# Patient Record
Sex: MALE
[De-identification: names, ages, dates, MRNs, and addresses within clinical notes are randomized per-mention and may not be internally consistent; named-entity substitution may affect disease eponyms.]

---

## 2019-12-28 ENCOUNTER — HOSPITAL ENCOUNTER (EMERGENCY)
Dept: HOSPITAL 25 - UCEAST | Age: 34
Discharge: HOME | End: 2019-12-28
Payer: COMMERCIAL

## 2019-12-28 DIAGNOSIS — J02.9: ICD-10-CM

## 2019-12-28 DIAGNOSIS — Z88.0: ICD-10-CM

## 2019-12-28 DIAGNOSIS — H66.90: Primary | ICD-10-CM

## 2019-12-28 DIAGNOSIS — Z88.2: ICD-10-CM

## 2019-12-28 PROCEDURE — 99202 OFFICE O/P NEW SF 15 MIN: CPT

## 2019-12-28 PROCEDURE — G0463 HOSPITAL OUTPT CLINIC VISIT: HCPCS

## 2019-12-28 PROCEDURE — 87651 STREP A DNA AMP PROBE: CPT

## 2019-12-28 NOTE — UC
Throat Pain/Nasal Marvin HPI





- HPI Summary


HPI Summary: 





started with Sore throat 4 days ago, low grade fever. today very fatigued and L 

ear also painful





- History of Current Complaint


Chief Complaint: UCGeneralIllness


Stated Complaint: THROAT PAIN


Time Seen by Provider: 12/28/19 08:41


Hx Obtained From: Patient


Onset/Duration: Gradual Onset


Severity: Mild


Pain Intensity: 2


Cough: None


Associated Signs & Symptoms: Positive: Fever





- Allergies/Home Medications


Allergies/Adverse Reactions: 


 Allergies











Allergy/AdvReac Type Severity Reaction Status Date / Time


 


amoxicillin Allergy  Hives Verified 12/28/19 08:33


 


Sulfa (Sulfonamide Allergy  Eyes Verified 12/28/19 08:33





Antibiotics)   Itchy/Swollen/Red/Watery  











Home Medications: 


 Home Medications





Aspirin TAB* [Aspirin 325 MG TAB*] 750 mg PO ONCE 12/28/19 [History Confirmed 12 /28/19]











PMH/Surg Hx/FS Hx/Imm Hx


Previously Healthy: Yes





- Surgical History


Surgical History: Yes


Surgery Procedure, Year, and Place: appendectomy 2009.  L wrist 2005.  R ankle 

2015.  inguinal hernia repair 2000





- Family History


Known Family History: Positive: Non-Contributory





- Social History


Occupation: Employed Full-time


Lives: With Family


Alcohol Use: Occasionally


Substance Use Type: None


Smoking Status (MU): Never Smoked Tobacco





Review of Systems


All Other Systems Reviewed And Are Negative: Yes


Constitutional: Positive: Fever


Skin: Positive: Negative.  Negative: Rash


ENT: Positive: Sore Throat, Ear Ache


Respiratory: Positive: Negative


Cardiovascular: Positive: Negative


Neurological: Positive: Negative.  Negative: Headache


Psychological: Positive: Negative


Is Patient Immunocompromised?: No





Physical Exam


Triage Information Reviewed: Yes


Appearance: Well-Appearing, No Pain Distress, Well-Nourished


Vital Signs: 


 Initial Vital Signs











Temp  96.9 F   12/28/19 08:35


 


Pulse  98   12/28/19 08:35


 


Resp  18   12/28/19 08:35


 


BP  119/87   12/28/19 08:35


 


Pulse Ox  96   12/28/19 08:35











Vital Signs Reviewed: Yes


Eye Exam: Normal


Eyes: Positive: Conjunctiva Clear


ENT: Positive: Pharyngeal erythema, TM red - Left, Tonsillar swelling, 

Tonsillar exudate


Neck exam: Normal


Neck: Positive: Supple, Nontender, No Lymphadenopathy


Respiratory Exam: Normal


Respiratory: Positive: Lungs clear


Cardiovascular Exam: Normal


Neurological Exam: Normal


Neurological: Positive: Alert


Psychological Exam: Normal


Skin Exam: Normal





Throat Pain/Nasal Course/Dx





- Differential Dx/Diagnosis


Differential Diagnosis/HQI/PQRI: Influenza, Otitis Media, Tonsillitis, URI


Provider Diagnosis: 


 Otitis media, Pharyngitis








Discharge ED





- Sign-Out/Discharge


Documenting (check all that apply): Patient Departure


All imaging exams completed and their final reports reviewed: No Studies





- Discharge Plan


Condition: Good


Disposition: HOME


Prescriptions: 


Azithromycin TAB* [Zithromax TAB (Z-ELVA) 250 mg #6 tabs] 2 tab PO .TODAY, THEN 

1 DAILY #1 elva


Patient Education Materials:  Ear Infection (ED), Pharyngitis (ED)


Referrals: 


No Primary Care Phys,NOPCP [Primary Care Provider] - 


Additional Instructions: 


drink plenty of fluids and rest


start zithromax (antibiotic) and take as prescribed





use Tylenol or ibuprofen for pain and fever





follow-up with primary care provider in home town if no better 2 days





- Billing Disposition and Condition


Condition: GOOD


Disposition: Home